# Patient Record
Sex: MALE
[De-identification: names, ages, dates, MRNs, and addresses within clinical notes are randomized per-mention and may not be internally consistent; named-entity substitution may affect disease eponyms.]

---

## 2021-08-31 ENCOUNTER — APPOINTMENT (OUTPATIENT)
Dept: GASTROENTEROLOGY | Facility: CLINIC | Age: 61
End: 2021-08-31
Payer: COMMERCIAL

## 2021-08-31 VITALS
TEMPERATURE: 97.5 F | BODY MASS INDEX: 26.6 KG/M2 | OXYGEN SATURATION: 97 % | WEIGHT: 190 LBS | RESPIRATION RATE: 17 BRPM | SYSTOLIC BLOOD PRESSURE: 118 MMHG | HEIGHT: 71 IN | HEART RATE: 67 BPM | DIASTOLIC BLOOD PRESSURE: 82 MMHG

## 2021-08-31 DIAGNOSIS — E78.00 PURE HYPERCHOLESTEROLEMIA, UNSPECIFIED: ICD-10-CM

## 2021-08-31 DIAGNOSIS — F17.200 NICOTINE DEPENDENCE, UNSPECIFIED, UNCOMPLICATED: ICD-10-CM

## 2021-08-31 DIAGNOSIS — Z78.9 OTHER SPECIFIED HEALTH STATUS: ICD-10-CM

## 2021-08-31 PROBLEM — Z00.00 ENCOUNTER FOR PREVENTIVE HEALTH EXAMINATION: Status: ACTIVE | Noted: 2021-08-31

## 2021-08-31 PROCEDURE — 99244 OFF/OP CNSLTJ NEW/EST MOD 40: CPT

## 2021-08-31 RX ORDER — ACETAMINOPHEN/DIPHENHYDRAMINE 500MG-25MG
TABLET ORAL
Refills: 0 | Status: ACTIVE | COMMUNITY

## 2021-08-31 RX ORDER — LUBIPROSTONE 8 UG/1
8 CAPSULE ORAL
Qty: 60 | Refills: 0 | Status: ACTIVE | COMMUNITY
Start: 2021-08-31 | End: 1900-01-01

## 2021-08-31 RX ORDER — DOCUSATE SODIUM 100 MG/1
100 CAPSULE ORAL
Qty: 60 | Refills: 3 | Status: ACTIVE | OUTPATIENT
Start: 2021-08-31

## 2021-08-31 NOTE — HISTORY OF PRESENT ILLNESS
[FreeTextEntry1] : Patient is a 60-year-old gentleman with history of irritable bowel syndrome with predominant constipation.  He has tried Metamucil and prune juice in the past and is currently taking Swiss Sd.  He had been on Linzess 290 mcg/day but took it on a as needed basis because of diarrhea.  He does complain of occasional bloating he denies seeing any blood in the stool.  He has no upper gastrointestinal symptoms.\par He had a colonoscopy elsewhere 6 weeks ago which revealed sigmoid diverticulosis and hemorrhoids.  He had a redundant colon that was otherwise normal.

## 2021-08-31 NOTE — ASSESSMENT
[FreeTextEntry1] : Patient with IBS/constipation.  We will try Bene fiber 1 tablespoon with breakfast and dinner and Colace 1 to 2 capsules a day.  If this does not help Amitiza 8 mcg twice daily will be tried.

## 2022-02-22 ENCOUNTER — APPOINTMENT (OUTPATIENT)
Dept: GASTROENTEROLOGY | Facility: CLINIC | Age: 62
End: 2022-02-22
Payer: COMMERCIAL

## 2022-02-22 VITALS
WEIGHT: 195 LBS | SYSTOLIC BLOOD PRESSURE: 112 MMHG | DIASTOLIC BLOOD PRESSURE: 67 MMHG | TEMPERATURE: 97.3 F | OXYGEN SATURATION: 96 % | BODY MASS INDEX: 27.3 KG/M2 | HEIGHT: 71 IN | HEART RATE: 70 BPM | RESPIRATION RATE: 18 BRPM

## 2022-02-22 PROCEDURE — 99214 OFFICE O/P EST MOD 30 MIN: CPT

## 2022-02-22 RX ORDER — STANDARDIZED SENNA CONCENTRATE AND DOCUSATE SODIUM 8.6; 5 MG/1; MG/1
8.6-5 TABLET ORAL DAILY
Qty: 60 | Refills: 2 | Status: ACTIVE | COMMUNITY
Start: 2021-11-29

## 2022-02-22 RX ORDER — ROSUVASTATIN CALCIUM 5 MG/1
5 TABLET, FILM COATED ORAL
Qty: 30 | Refills: 0 | Status: ACTIVE | COMMUNITY
Start: 2021-11-10

## 2022-02-22 NOTE — HISTORY OF PRESENT ILLNESS
[FreeTextEntry1] : Patient is a 61-year-old gentleman with history of irritable bowel syndrome with predominant constipation.  He has tried Metamucil and prune juice in the past and is currently taking Swiss Sd.  He had been on Linzess 290 mcg/day but took it on a as needed basis because of diarrhea.  He does complain of occasional bloating he denies seeing any blood in the stool. \par  He has no upper gastrointestinal symptoms.\par He had a colonoscopy elsewhere 7/2021 which revealed sigmoid diverticulosis and hemorrhoids.  He had a redundant colon that was otherwise normal.\par Patient also had an upper endoscopy and that was essentially normal.

## 2022-02-22 NOTE — ASSESSMENT
[FreeTextEntry1] : Patient with continued constipation and some left lower quadrant bloating.  He will continue psyllium, Colace, prune juice.  He will take Linzess on a as needed basis.  He was told to take Senokot as on a more regular basis.

## 2022-03-01 ENCOUNTER — APPOINTMENT (OUTPATIENT)
Dept: GASTROENTEROLOGY | Facility: CLINIC | Age: 62
End: 2022-03-01
Payer: COMMERCIAL

## 2022-03-01 PROCEDURE — 99442: CPT

## 2022-03-06 NOTE — HISTORY OF PRESENT ILLNESS
[Home] : at home, [unfilled] , at the time of the visit. [Medical Office: ([facility])___] : at the medical office located in  [Verbal consent obtained from patient] : the patient, [unfilled] [FreeTextEntry1] : Patient is a 61-year-old gentleman with history of irritable bowel syndrome with predominant constipation.  He has tried Metamucil and prune juice in the past and is currently taking Swiss Sd.  He had been on Linzess 290 mcg/day but took it on a as needed basis because of diarrhea.  He does complain of occasional bloating he denies seeing any blood in the stool. \par  He has no upper gastrointestinal symptoms.\par He had a colonoscopy elsewhere 7/2021 which revealed sigmoid diverticulosis and hemorrhoids.  He had a redundant colon that was otherwise normal.\par Patient also had an upper endoscopy and that was essentially normal.

## 2022-03-06 NOTE — ASSESSMENT
[FreeTextEntry1] : Patient still with irregular bowel movements.  He does complain of constipation and bloating.  He has not been taking Linzess regularly.  He will take Linzess daily.  We may need to add a lactulose or Senokot-S to his regimen.\par \par Time spent in counseling 15 min.

## 2022-08-10 ENCOUNTER — APPOINTMENT (OUTPATIENT)
Dept: GASTROENTEROLOGY | Facility: CLINIC | Age: 62
End: 2022-08-10

## 2022-08-10 VITALS
OXYGEN SATURATION: 97 % | WEIGHT: 197 LBS | SYSTOLIC BLOOD PRESSURE: 120 MMHG | HEART RATE: 68 BPM | BODY MASS INDEX: 27.58 KG/M2 | TEMPERATURE: 97.9 F | HEIGHT: 71 IN | DIASTOLIC BLOOD PRESSURE: 80 MMHG

## 2022-08-10 PROCEDURE — 99214 OFFICE O/P EST MOD 30 MIN: CPT

## 2022-08-14 NOTE — HISTORY OF PRESENT ILLNESS
[FreeTextEntry1] : Patient is a 61-year-old gentleman with history of irritable bowel syndrome with predominant constipation.  He has tried Metamucil and prune juice in the past and continues to take..  He had been on Linzess 290 mcg/day but took it on an as needed basis because of diarrhea. Takes infrequently.  He does complain of occasional bloating he denies seeing any blood in the stool. \par  He has no upper gastrointestinal symptoms.\par He had a colonoscopy elsewhere 7/2021 which revealed sigmoid diverticulosis and hemorrhoids.  He had a redundant colon that was otherwise normal.\par Patient also had an upper endoscopy and that was essentially normal.\par \par

## 2022-08-14 NOTE — ASSESSMENT
[FreeTextEntry1] : Patient with history of IBS and predominant constipation.  He continues to take Metamucil.  He takes Linzess infrequently.  He still complaining of some bloating and rectal pressure.  He will be given IBgard.  Abdominal sonogram will be ordered.

## 2022-08-15 ENCOUNTER — APPOINTMENT (OUTPATIENT)
Dept: ULTRASOUND IMAGING | Facility: CLINIC | Age: 62
End: 2022-08-15

## 2022-08-15 PROCEDURE — 76700 US EXAM ABDOM COMPLETE: CPT

## 2023-02-08 ENCOUNTER — APPOINTMENT (OUTPATIENT)
Dept: GASTROENTEROLOGY | Facility: CLINIC | Age: 63
End: 2023-02-08

## 2023-02-13 ENCOUNTER — APPOINTMENT (OUTPATIENT)
Dept: GASTROENTEROLOGY | Facility: CLINIC | Age: 63
End: 2023-02-13
Payer: COMMERCIAL

## 2023-02-13 VITALS
OXYGEN SATURATION: 96 % | WEIGHT: 190 LBS | TEMPERATURE: 97.8 F | HEIGHT: 71 IN | SYSTOLIC BLOOD PRESSURE: 112 MMHG | HEART RATE: 81 BPM | BODY MASS INDEX: 26.6 KG/M2 | DIASTOLIC BLOOD PRESSURE: 69 MMHG

## 2023-02-13 DIAGNOSIS — R14.0 ABDOMINAL DISTENSION (GASEOUS): ICD-10-CM

## 2023-02-13 PROCEDURE — 99214 OFFICE O/P EST MOD 30 MIN: CPT

## 2023-02-13 RX ORDER — PSYLLIUM SEED
PACKET (EA) ORAL
Refills: 0 | Status: ACTIVE | COMMUNITY

## 2023-02-13 NOTE — ASSESSMENT
[FreeTextEntry1] : Patient with chronic IBS symptoms and irregular bowel movements.  He does have some bloating.  An abdominal sonogram was normal.  Recent colonoscopy revealed sigmoid diverticulosis and a redundant sigmoid colon.  He will continue Metamucil and and prune juice.  We will give him a trial of Atrantil.  FOBT will be sent to the lab.  If the symptoms persist, a CAT scan of the abdomen and pelvis will be ordered.

## 2023-02-13 NOTE — PHYSICAL EXAM
[Alert] : alert [Normal Voice/Communication] : normal voice/communication [Healthy Appearing] : healthy appearing [No Acute Distress] : no acute distress [Sclera] : the sclera and conjunctiva were normal [Hearing Threshold Finger Rub Not [place]] : hearing was normal [Normal Lips/Gums] : the lips and gums were normal [Oropharynx] : the oropharynx was normal [Normal Appearance] : the appearance of the neck was normal [No Neck Mass] : no neck mass was observed [No Respiratory Distress] : no respiratory distress [No Acc Muscle Use] : no accessory muscle use [Respiration, Rhythm And Depth] : normal respiratory rhythm and effort [Auscultation Breath Sounds / Voice Sounds] : lungs were clear to auscultation bilaterally [Heart Rate And Rhythm] : heart rate was normal and rhythm regular [Normal S1, S2] : normal S1 and S2 [Murmurs] : no murmurs [Bowel Sounds] : normal bowel sounds [Abdomen Tenderness] : non-tender [No Masses] : no abdominal mass palpated [Abdomen Soft] : soft [] : no hepatosplenomegaly [No CVA Tenderness] : no CVA  tenderness [No Spinal Tenderness] : no spinal tenderness [Abnormal Walk] : normal gait [Oriented To Time, Place, And Person] : oriented to person, place, and time

## 2023-02-13 NOTE — HISTORY OF PRESENT ILLNESS
[FreeTextEntry1] : Patient is a 62-year-old gentleman with history of irritable bowel syndrome with predominant constipation.  He has tried Metamucil and prune juice in the past and continues to take..  He had been on Linzess 290 mcg/day but took it on an as needed basis because of diarrhea. Takes infrequently.  He does complain of occasional bloating. He denies seeing any blood in the stool. \par  He has no upper gastrointestinal symptoms.\par He had a colonoscopy elsewhere 7/2021 which revealed sigmoid diverticulosis and hemorrhoids.  He had a redundant colon that was otherwise normal.\par Patient also had an upper endoscopy and that was essentially normal.\par An abdominal sonogram done on 8/15/2022 was essentially normal.\par \par

## 2023-02-26 LAB — HEMOCCULT STL QL IA: NEGATIVE

## 2023-03-27 ENCOUNTER — OFFICE (OUTPATIENT)
Dept: URBAN - METROPOLITAN AREA CLINIC 90 | Facility: CLINIC | Age: 63
Setting detail: OPHTHALMOLOGY
End: 2023-03-27
Payer: COMMERCIAL

## 2023-03-27 DIAGNOSIS — H25.13: ICD-10-CM

## 2023-03-27 DIAGNOSIS — H40.013: ICD-10-CM

## 2023-03-27 DIAGNOSIS — H31.011: ICD-10-CM

## 2023-03-27 PROCEDURE — 92134 CPTRZ OPH DX IMG PST SGM RTA: CPT | Performed by: OPHTHALMOLOGY

## 2023-03-27 PROCEDURE — 92083 EXTENDED VISUAL FIELD XM: CPT | Performed by: OPHTHALMOLOGY

## 2023-03-27 PROCEDURE — 92014 COMPRE OPH EXAM EST PT 1/>: CPT | Performed by: OPHTHALMOLOGY

## 2023-03-27 ASSESSMENT — REFRACTION_MANIFEST
OS_VA2: 20/20
OD_VA2: 20/20
OS_VA2: 20/25(J1)
OS_CYLINDER: -0.50
OU_VA: 20/20
OS_AXIS: 075
OD_SPHERE: +2.50
OS_VA1: 20/20
OD_AXIS: 140
OD_VA2: 20/25(J1)
OS_CYLINDER: -1.00
OD_CYLINDER: -0.25
OD_ADD: +2.25
OS_SPHERE: +3.00
OS_AXIS: 089
OD_SPHERE: +3.50
OS_ADD: +2.25
OD_ADD: +2.50
OD_CYLINDER: -0.25
OS_SPHERE: PLANO
OD_VA1: 20/20
OD_ADD: +2.50
OD_SPHERE: PLANO
OS_SPHERE: PLANO
OS_ADD: +2.50
OS_VA1: 20/20
OD_AXIS: 140
OD_CYLINDER: -0.25
OS_SPHERE: +2.50
OS_SPHERE: +0.25
OS_AXIS: 075
OS_VA2: 20/20
OD_AXIS: 115
OS_ADD: +2.50
OD_VA1: 20/20
OS_CYLINDER: -1.00
OD_VA2: 20/20

## 2023-03-27 ASSESSMENT — KERATOMETRY
OD_K2POWER_DIOPTERS: 41.25
OD_K1POWER_DIOPTERS: 40.25
OS_K2POWER_DIOPTERS: 41.00
OS_AXISANGLE_DEGREES: 099
OS_K1POWER_DIOPTERS: 40.75
METHOD_AUTO_MANUAL: AUTO
OD_AXISANGLE_DEGREES: 085

## 2023-03-27 ASSESSMENT — REFRACTION_AUTOREFRACTION
OD_AXIS: 155
OS_SPHERE: +0.50
OS_CYLINDER: -0.75
OD_SPHERE: +0.75
OS_AXIS: 089
OD_CYLINDER: -0.25

## 2023-03-27 ASSESSMENT — SPHEQUIV_DERIVED
OS_SPHEQUIV: 0.125
OD_SPHEQUIV: 0.625
OD_SPHEQUIV: 3.375
OS_SPHEQUIV: 2.5
OS_SPHEQUIV: -0.25

## 2023-03-27 ASSESSMENT — AXIALLENGTH_DERIVED
OS_AL: 24.7035
OS_AL: 23.5821
OD_AL: 23.291
OD_AL: 24.3842
OS_AL: 24.5443

## 2023-03-27 ASSESSMENT — TONOMETRY
OS_IOP_MMHG: 11
OD_IOP_MMHG: 10

## 2023-03-27 ASSESSMENT — CONFRONTATIONAL VISUAL FIELD TEST (CVF)
OD_FINDINGS: FULL
OS_FINDINGS: FULL

## 2023-03-27 ASSESSMENT — VISUAL ACUITY
OS_BCVA: 20/20
OD_BCVA: 20/25+1

## 2023-07-11 ENCOUNTER — APPOINTMENT (OUTPATIENT)
Dept: GASTROENTEROLOGY | Facility: CLINIC | Age: 63
End: 2023-07-11
Payer: COMMERCIAL

## 2023-07-11 VITALS
BODY MASS INDEX: 26.04 KG/M2 | DIASTOLIC BLOOD PRESSURE: 73 MMHG | TEMPERATURE: 98.1 F | HEIGHT: 71 IN | OXYGEN SATURATION: 98 % | SYSTOLIC BLOOD PRESSURE: 109 MMHG | HEART RATE: 67 BPM | WEIGHT: 186 LBS

## 2023-07-11 PROCEDURE — 99214 OFFICE O/P EST MOD 30 MIN: CPT

## 2023-07-11 NOTE — ASSESSMENT
[FreeTextEntry1] : Bowel syndrome with constipation.  Recent exacerbation. Dietary and lifestyle modification discussed with the patient.  This was discussed with the patient.  He understands and all questions were answered. Patient is referred for a CT of the abdomen and pelvis.\par Rectal bleeding due to internal hemorrhoids.  Topical therapy prescribed.\par

## 2023-07-11 NOTE — PHYSICAL EXAM
[Cervical Lymph Nodes Enlarged Posterior Bilaterally] : no posterior cervical lymphadenopathy [No CVA Tenderness] : no CVA  tenderness [Abnormal Walk] : normal gait [Motor Exam] : the motor exam was normal [Normal] : oriented to person, place, and time [Normal Mood] : the mood was normal [Oriented To Time, Place, And Person] : oriented to person, place, and time

## 2023-07-11 NOTE — HISTORY OF PRESENT ILLNESS
[FreeTextEntry1] : 62-year-old man with a long history of irritable bowel syndrome with constipation.  Takes Metamucil and a stool softener but recently he continues to complain of incomplete evacuation and bloating.  Takes Metamucil daily.  He also has rectal pain and hemorrhoid problem.  He denies rectal bleeding, melena or hematemesis.  Colonoscopy in 2021 showed sigmoid diverticulosis and internal hemorrhoids but was otherwise unremarkable.

## 2023-07-12 ENCOUNTER — RESULT REVIEW (OUTPATIENT)
Age: 63
End: 2023-07-12

## 2023-07-13 RX ORDER — IBUPROFEN 800 MG/1
800 TABLET, FILM COATED ORAL
Qty: 20 | Refills: 0 | Status: ACTIVE | COMMUNITY
Start: 2023-03-13

## 2023-07-13 RX ORDER — TAMSULOSIN HYDROCHLORIDE 0.4 MG/1
0.4 CAPSULE ORAL
Qty: 30 | Refills: 0 | Status: ACTIVE | COMMUNITY
Start: 2023-06-15

## 2023-07-13 RX ORDER — CEPHALEXIN 500 MG/1
500 TABLET ORAL
Qty: 20 | Refills: 0 | Status: ACTIVE | COMMUNITY
Start: 2023-04-04

## 2023-07-13 RX ORDER — CEPHALEXIN 500 MG/1
500 CAPSULE ORAL
Qty: 20 | Refills: 0 | Status: ACTIVE | COMMUNITY
Start: 2023-04-04

## 2023-07-15 ENCOUNTER — APPOINTMENT (OUTPATIENT)
Dept: CT IMAGING | Facility: IMAGING CENTER | Age: 63
End: 2023-07-15
Payer: COMMERCIAL

## 2023-07-15 ENCOUNTER — OUTPATIENT (OUTPATIENT)
Dept: OUTPATIENT SERVICES | Facility: HOSPITAL | Age: 63
LOS: 1 days | End: 2023-07-15
Payer: COMMERCIAL

## 2023-07-15 DIAGNOSIS — K64.8 OTHER HEMORRHOIDS: ICD-10-CM

## 2023-07-15 DIAGNOSIS — K57.92 DIVERTICULITIS OF INTESTINE, PART UNSPECIFIED, WITHOUT PERFORATION OR ABSCESS WITHOUT BLEEDING: ICD-10-CM

## 2023-07-15 PROCEDURE — 74177 CT ABD & PELVIS W/CONTRAST: CPT | Mod: 26

## 2023-07-15 PROCEDURE — 74177 CT ABD & PELVIS W/CONTRAST: CPT

## 2023-07-20 ENCOUNTER — NON-APPOINTMENT (OUTPATIENT)
Age: 63
End: 2023-07-20

## 2023-08-10 ENCOUNTER — APPOINTMENT (OUTPATIENT)
Dept: GASTROENTEROLOGY | Facility: CLINIC | Age: 63
End: 2023-08-10
Payer: COMMERCIAL

## 2023-08-10 VITALS
TEMPERATURE: 97.8 F | HEIGHT: 71.5 IN | BODY MASS INDEX: 26.02 KG/M2 | HEART RATE: 66 BPM | SYSTOLIC BLOOD PRESSURE: 104 MMHG | WEIGHT: 190 LBS | DIASTOLIC BLOOD PRESSURE: 64 MMHG | OXYGEN SATURATION: 98 %

## 2023-08-10 DIAGNOSIS — K57.92 DIVERTICULITIS OF INTESTINE, PART UNSPECIFIED, W/OUT PERFORATION OR ABSCESS W/OUT BLEEDING: ICD-10-CM

## 2023-08-10 PROCEDURE — 99214 OFFICE O/P EST MOD 30 MIN: CPT

## 2023-08-10 RX ORDER — AMOXICILLIN 500 MG/1
500 CAPSULE ORAL
Qty: 21 | Refills: 0 | Status: DISCONTINUED | COMMUNITY
Start: 2023-03-13 | End: 2023-08-10

## 2023-08-10 RX ORDER — LINACLOTIDE 290 UG/1
290 CAPSULE, GELATIN COATED ORAL
Qty: 90 | Refills: 2 | Status: ACTIVE | COMMUNITY
Start: 2021-11-29 | End: 1900-01-01

## 2023-08-10 NOTE — ASSESSMENT
[FreeTextEntry1] : Irritable bowel syndrome with constipation.  Dietary and lifestyle modification discussed with the patient.  Patient will continue on prune juice and the fiber supplement.  Linzess as needed. This  was discussed with the patient.  He understands and all questions were answered.

## 2023-08-10 NOTE — HISTORY OF PRESENT ILLNESS
[FreeTextEntry1] : 62-year-old male with chronic constipation.  He currently takes a fiber supplement and prune juice with some response.  He has previously been on Linzess and Amitiza but has not been taking it at the present time.  Recent CT of the abdomen pelvis was unremarkable.  He denies rectal bleeding, melena or hematemesis.

## 2023-11-09 ENCOUNTER — APPOINTMENT (OUTPATIENT)
Dept: GASTROENTEROLOGY | Facility: CLINIC | Age: 63
End: 2023-11-09
Payer: COMMERCIAL

## 2023-11-09 VITALS
HEART RATE: 68 BPM | SYSTOLIC BLOOD PRESSURE: 116 MMHG | HEIGHT: 71 IN | TEMPERATURE: 97.7 F | BODY MASS INDEX: 26.74 KG/M2 | OXYGEN SATURATION: 98 % | WEIGHT: 191 LBS | DIASTOLIC BLOOD PRESSURE: 76 MMHG

## 2023-11-09 PROCEDURE — 99214 OFFICE O/P EST MOD 30 MIN: CPT

## 2023-11-09 RX ORDER — TANNIC/CHESTNUT/PEPPERMINT 275 MG
CAPSULE ORAL
Qty: 90 | Refills: 0 | Status: DISCONTINUED | OUTPATIENT
Start: 2023-02-13 | End: 2023-11-09

## 2023-11-09 RX ORDER — PEPPERMINT OIL 90 MG
90 CAPSULE, DELAYED, AND EXTENDED RELEASE ORAL
Qty: 60 | Refills: 0 | Status: DISCONTINUED | OUTPATIENT
Start: 2022-08-10 | End: 2023-11-09

## 2023-11-09 RX ORDER — WHEAT DEXTRIN 3 G/4 G
POWDER (GRAM) ORAL
Qty: 1 | Refills: 0 | Status: DISCONTINUED | OUTPATIENT
Start: 2021-08-31 | End: 2023-11-09

## 2023-12-07 ENCOUNTER — APPOINTMENT (OUTPATIENT)
Dept: GASTROENTEROLOGY | Facility: CLINIC | Age: 63
End: 2023-12-07
Payer: COMMERCIAL

## 2023-12-07 VITALS
WEIGHT: 188 LBS | DIASTOLIC BLOOD PRESSURE: 75 MMHG | BODY MASS INDEX: 26.32 KG/M2 | HEART RATE: 73 BPM | OXYGEN SATURATION: 98 % | TEMPERATURE: 97.9 F | SYSTOLIC BLOOD PRESSURE: 125 MMHG | HEIGHT: 71 IN

## 2023-12-07 DIAGNOSIS — K59.00 CONSTIPATION, UNSPECIFIED: ICD-10-CM

## 2023-12-07 DIAGNOSIS — K64.8 OTHER HEMORRHOIDS: ICD-10-CM

## 2023-12-07 PROCEDURE — 99214 OFFICE O/P EST MOD 30 MIN: CPT

## 2023-12-07 RX ORDER — HYDROCORTISONE 10 MG/G
1 CREAM TOPICAL
Qty: 1 | Refills: 0 | Status: ACTIVE | OUTPATIENT
Start: 2023-07-11

## 2024-03-07 ENCOUNTER — APPOINTMENT (OUTPATIENT)
Dept: GASTROENTEROLOGY | Facility: CLINIC | Age: 64
End: 2024-03-07
Payer: COMMERCIAL

## 2024-03-07 VITALS
OXYGEN SATURATION: 97 % | DIASTOLIC BLOOD PRESSURE: 82 MMHG | BODY MASS INDEX: 26.88 KG/M2 | HEART RATE: 65 BPM | WEIGHT: 192 LBS | TEMPERATURE: 98 F | RESPIRATION RATE: 18 BRPM | HEIGHT: 71 IN | SYSTOLIC BLOOD PRESSURE: 135 MMHG

## 2024-03-07 DIAGNOSIS — R19.8 OTHER SPECIFIED SYMPTOMS AND SIGNS INVOLVING THE DIGESTIVE SYSTEM AND ABDOMEN: ICD-10-CM

## 2024-03-07 DIAGNOSIS — K58.9 IRRITABLE BOWEL SYNDROME W/OUT DIARRHEA: ICD-10-CM

## 2024-03-07 DIAGNOSIS — K59.4 ANAL SPASM: ICD-10-CM

## 2024-03-07 PROCEDURE — 99214 OFFICE O/P EST MOD 30 MIN: CPT

## 2024-03-07 NOTE — ASSESSMENT
[FreeTextEntry1] : Rectal spasm. Dietary and lifestyle modification discussed with the patient.  Continue current medical regimen.  Patient is referred to urology for further evaluation.  This was discussed with the patient.  He understands and all questions were answered.

## 2024-03-07 NOTE — HISTORY OF PRESENT ILLNESS
[FreeTextEntry1] : 63-year-old male with chronic rectal spasm.  He takes Lubiprostone 8mcg and MiraLAX daily with good response however still complains of intermittent rectal spasm.  He saw Dr. Ordonez who prescribed topical therapy.  He has not followed up with a urologist.

## 2024-04-17 ENCOUNTER — RX RENEWAL (OUTPATIENT)
Age: 64
End: 2024-04-17

## 2024-04-17 RX ORDER — LUBIPROSTONE 8 UG/1
8 CAPSULE ORAL TWICE DAILY
Qty: 60 | Refills: 3 | Status: ACTIVE | COMMUNITY
Start: 2023-11-09 | End: 1900-01-01

## 2024-04-30 ENCOUNTER — APPOINTMENT (OUTPATIENT)
Dept: UROLOGY | Facility: CLINIC | Age: 64
End: 2024-04-30

## 2024-05-02 ENCOUNTER — OFFICE (OUTPATIENT)
Dept: URBAN - METROPOLITAN AREA CLINIC 90 | Facility: CLINIC | Age: 64
Setting detail: OPHTHALMOLOGY
End: 2024-05-02
Payer: COMMERCIAL

## 2024-05-02 DIAGNOSIS — H11.133: ICD-10-CM

## 2024-05-02 DIAGNOSIS — H31.011: ICD-10-CM

## 2024-05-02 DIAGNOSIS — H40.013: ICD-10-CM

## 2024-05-02 DIAGNOSIS — H25.13: ICD-10-CM

## 2024-05-02 PROCEDURE — 92083 EXTENDED VISUAL FIELD XM: CPT | Performed by: OPHTHALMOLOGY

## 2024-05-02 PROCEDURE — 92014 COMPRE OPH EXAM EST PT 1/>: CPT | Performed by: OPHTHALMOLOGY

## 2024-05-02 PROCEDURE — 76514 ECHO EXAM OF EYE THICKNESS: CPT | Performed by: OPHTHALMOLOGY

## 2024-05-02 PROCEDURE — 92250 FUNDUS PHOTOGRAPHY W/I&R: CPT | Performed by: OPHTHALMOLOGY

## 2024-05-02 ASSESSMENT — CONFRONTATIONAL VISUAL FIELD TEST (CVF)
OD_FINDINGS: FULL
OS_FINDINGS: FULL

## 2024-05-07 ENCOUNTER — APPOINTMENT (OUTPATIENT)
Dept: UROLOGY | Facility: CLINIC | Age: 64
End: 2024-05-07
Payer: COMMERCIAL

## 2024-05-07 VITALS
HEIGHT: 71 IN | DIASTOLIC BLOOD PRESSURE: 73 MMHG | SYSTOLIC BLOOD PRESSURE: 126 MMHG | WEIGHT: 185 LBS | HEART RATE: 68 BPM | BODY MASS INDEX: 25.9 KG/M2 | TEMPERATURE: 97.2 F | RESPIRATION RATE: 16 BRPM

## 2024-05-07 DIAGNOSIS — R37 SEXUAL DYSFUNCTION, UNSPECIFIED: ICD-10-CM

## 2024-05-07 DIAGNOSIS — R39.15 URGENCY OF URINATION: ICD-10-CM

## 2024-05-07 PROCEDURE — 99204 OFFICE O/P NEW MOD 45 MIN: CPT

## 2024-05-07 RX ORDER — TADALAFIL 5 MG/1
5 TABLET ORAL
Qty: 30 | Refills: 3 | Status: ACTIVE | COMMUNITY
Start: 2024-05-07 | End: 1900-01-01

## 2024-05-08 LAB
APPEARANCE: CLEAR
BACTERIA UR CULT: NORMAL
BACTERIA: NEGATIVE /HPF
BILIRUBIN URINE: NEGATIVE
BLOOD URINE: NEGATIVE
CAST: 0 /LPF
COLOR: YELLOW
EPITHELIAL CELLS: 0 /HPF
GLUCOSE QUALITATIVE U: NEGATIVE MG/DL
KETONES URINE: NEGATIVE MG/DL
LEUKOCYTE ESTERASE URINE: NEGATIVE
MICROSCOPIC-UA: NORMAL
NITRITE URINE: NEGATIVE
PH URINE: 6.5
PROTEIN URINE: NEGATIVE MG/DL
PSA FREE FLD-MCNC: 32 %
PSA FREE SERPL-MCNC: 0.2 NG/ML
PSA SERPL-MCNC: 0.62 NG/ML
RED BLOOD CELLS URINE: 1 /HPF
SPECIFIC GRAVITY URINE: 1.02
UROBILINOGEN URINE: 0.2 MG/DL
WHITE BLOOD CELLS URINE: 0 /HPF

## 2024-05-09 PROBLEM — R39.15 URINARY URGENCY: Status: ACTIVE | Noted: 2024-03-07

## 2024-05-09 NOTE — HISTORY OF PRESENT ILLNESS
[FreeTextEntry1] : 5/7/24 - This 63-year-old male presents for eval of frequent urination and decreased FOS.  Nocturia is 6-8 and daytime is 8-10x. He was maintained on medication previously - does not remember what but he stopped due to sexual side effects. Last PSA was unknown and one year ago. Was told by former urologist that prostate is enlarged. Denies previous prostate MRI or PNB. Denies hematuria, dysuria.  Has current concerns about ED - c/o premature ejaculation and decreased sensation when he orgasms. No complaints over the quality of the erection and he informs that he is able to complete the act. Does have a slight decrease in libido over the last 2 years.   PMH: Horseshoe kidney, HLD, constipation  PSH: Denies FH: Non-contributory Social: current smoker

## 2024-09-04 ENCOUNTER — RX RENEWAL (OUTPATIENT)
Age: 64
End: 2024-09-04

## 2024-09-04 ENCOUNTER — APPOINTMENT (OUTPATIENT)
Dept: UROLOGY | Facility: CLINIC | Age: 64
End: 2024-09-04
Payer: COMMERCIAL

## 2024-09-04 VITALS
SYSTOLIC BLOOD PRESSURE: 122 MMHG | WEIGHT: 185 LBS | DIASTOLIC BLOOD PRESSURE: 81 MMHG | BODY MASS INDEX: 25.8 KG/M2 | HEART RATE: 64 BPM

## 2024-09-04 DIAGNOSIS — N40.1 BENIGN PROSTATIC HYPERPLASIA WITH LOWER URINARY TRACT SYMPMS: ICD-10-CM

## 2024-09-04 DIAGNOSIS — N13.8 BENIGN PROSTATIC HYPERPLASIA WITH LOWER URINARY TRACT SYMPMS: ICD-10-CM

## 2024-09-04 PROCEDURE — G2211 COMPLEX E/M VISIT ADD ON: CPT | Mod: NC

## 2024-09-04 PROCEDURE — 99213 OFFICE O/P EST LOW 20 MIN: CPT

## 2024-09-04 RX ORDER — TERAZOSIN 2 MG/1
2 CAPSULE ORAL
Qty: 7 | Refills: 0 | Status: ACTIVE | COMMUNITY
Start: 2024-09-04 | End: 1900-01-01

## 2024-09-04 RX ORDER — TERAZOSIN 5 MG/1
5 CAPSULE ORAL
Qty: 90 | Refills: 0 | Status: ACTIVE | COMMUNITY
Start: 2024-09-04 | End: 1900-01-01

## 2024-09-05 NOTE — HISTORY OF PRESENT ILLNESS
[FreeTextEntry1] : 5/7/24 - This 63-year-old male presents for eval of frequent urination and decreased FOS.  Nocturia is 6-8 and daytime is 8-10x. He was maintained on medication previously - does not remember what but he stopped due to sexual side effects. Last PSA was unknown and one year ago. Was told by former urologist that prostate is enlarged. Denies previous prostate MRI or PNB. Denies hematuria, dysuria.  Has current concerns about ED - c/o premature ejaculation and decreased sensation when he orgasms. No complaints over the quality of the erection and he informs that he is able to complete the act. Does have a slight decrease in libido over the last 2 years.   PMH: Horseshoe kidney, HLD, constipation  PSH: Denies FH: Non-contributory Social: current smoker  9/24 was prescribed tadaifil - but waited to try and PCP gave him tamsulosin. he noted a better flow but if anything perhaps more frequency and blunted orgasm. then tried the tadalafil with no much change.  had him drink and wait for a Urolfow but machine did not record.

## 2024-09-09 ENCOUNTER — RX RENEWAL (OUTPATIENT)
Age: 64
End: 2024-09-09

## 2024-09-12 ENCOUNTER — RX RENEWAL (OUTPATIENT)
Age: 64
End: 2024-09-12

## 2024-10-22 ENCOUNTER — APPOINTMENT (OUTPATIENT)
Dept: UROLOGY | Facility: CLINIC | Age: 64
End: 2024-10-22
Payer: COMMERCIAL

## 2024-10-22 DIAGNOSIS — R35.1 NOCTURIA: ICD-10-CM

## 2024-10-22 PROCEDURE — G2211 COMPLEX E/M VISIT ADD ON: CPT | Mod: NC

## 2024-10-22 PROCEDURE — 99213 OFFICE O/P EST LOW 20 MIN: CPT

## 2024-10-22 PROCEDURE — 51798 US URINE CAPACITY MEASURE: CPT

## 2024-10-22 PROCEDURE — 51741 ELECTRO-UROFLOWMETRY FIRST: CPT

## 2024-10-23 ENCOUNTER — RX RENEWAL (OUTPATIENT)
Age: 64
End: 2024-10-23

## 2024-10-23 RX ORDER — TERAZOSIN 2 MG/1
2 CAPSULE ORAL
Qty: 90 | Refills: 3 | Status: ACTIVE | COMMUNITY
Start: 2024-10-22 | End: 1900-01-01

## 2024-10-28 ENCOUNTER — RX RENEWAL (OUTPATIENT)
Age: 64
End: 2024-10-28

## 2024-10-28 RX ORDER — TERAZOSIN 5 MG/1
5 CAPSULE ORAL
Qty: 90 | Refills: 1 | Status: ACTIVE | COMMUNITY
Start: 2024-10-22 | End: 1900-01-01

## 2024-11-14 ENCOUNTER — APPOINTMENT (OUTPATIENT)
Dept: GASTROENTEROLOGY | Facility: CLINIC | Age: 64
End: 2024-11-14
Payer: COMMERCIAL

## 2024-11-14 ENCOUNTER — LABORATORY RESULT (OUTPATIENT)
Age: 64
End: 2024-11-14

## 2024-11-14 VITALS
OXYGEN SATURATION: 95 % | HEART RATE: 81 BPM | SYSTOLIC BLOOD PRESSURE: 133 MMHG | DIASTOLIC BLOOD PRESSURE: 82 MMHG | BODY MASS INDEX: 26.74 KG/M2 | HEIGHT: 71 IN | WEIGHT: 191 LBS | TEMPERATURE: 97.2 F

## 2024-11-14 DIAGNOSIS — K58.9 IRRITABLE BOWEL SYNDROME, UNSPECIFIED: ICD-10-CM

## 2024-11-14 DIAGNOSIS — R14.0 ABDOMINAL DISTENSION (GASEOUS): ICD-10-CM

## 2024-11-14 DIAGNOSIS — R19.8 OTHER SPECIFIED SYMPTOMS AND SIGNS INVOLVING THE DIGESTIVE SYSTEM AND ABDOMEN: ICD-10-CM

## 2024-11-14 DIAGNOSIS — K59.4 ANAL SPASM: ICD-10-CM

## 2024-11-14 PROCEDURE — G2211 COMPLEX E/M VISIT ADD ON: CPT | Mod: NC

## 2024-11-14 PROCEDURE — 99214 OFFICE O/P EST MOD 30 MIN: CPT

## 2024-11-17 LAB
ALMOND IGE QN: <0.1 KUA/L
BRAZIL NUT IGE QN: <0.1 KUA/L
CASHEW NUT IGE QN: <0.1 KUA/L
CODFISH IGE QN: <0.1 KUA/L
COW MILK IGE QN: <0.1 KUA/L
DEPRECATED ALMOND IGE RAST QL: 0
DEPRECATED BRAZIL NUT IGE RAST QL: 0
DEPRECATED CASHEW NUT IGE RAST QL: 0
DEPRECATED CODFISH IGE RAST QL: 0
DEPRECATED COW MILK IGE RAST QL: 0
DEPRECATED EGG WHITE IGE RAST QL: 0
DEPRECATED HAZELNUT IGE RAST QL: 0
DEPRECATED PEANUT IGE RAST QL: 0
DEPRECATED SALMON IGE RAST QL: 0
DEPRECATED SCALLOP IGE RAST QL: <0.1 KUA/L
DEPRECATED SESAME SEED IGE RAST QL: 0
DEPRECATED SHRIMP IGE RAST QL: 0
DEPRECATED SOYBEAN IGE RAST QL: 0
DEPRECATED TUNA IGE RAST QL: 0
DEPRECATED WALNUT IGE RAST QL: 0
DEPRECATED WHEAT IGE RAST QL: 0
EGG WHITE IGE QN: <0.1 KUA/L
HAZELNUT IGE QN: <0.1 KUA/L
PEANUT IGE QN: <0.1 KUA/L
SALMON IGE QN: <0.1 KUA/L
SCALLOP IGE QN: 0
SCALLOP IGE QN: <0.1 KUA/L
SESAME SEED IGE QN: <0.1 KUA/L
SOYBEAN IGE QN: <0.1 KUA/L
TOTAL IGE SMQN RAST: 10 KU/L
TUNA IGE QN: <0.1 KUA/L
WALNUT IGE QN: <0.1 KUA/L
WHEAT IGE QN: <0.1 KUA/L

## 2024-11-20 LAB
CELIAC DISEASE INTERPRETATION: NORMAL
CELIAC GENE PAIRS PRESENT: NO
DQ ALPHA 1: NORMAL
DQ BETA 1: NORMAL
IMMUNOGLOBULIN A (IGA): 118 MG/DL

## 2024-11-26 ENCOUNTER — TRANSCRIPTION ENCOUNTER (OUTPATIENT)
Age: 64
End: 2024-11-26

## 2024-12-16 ENCOUNTER — RX RENEWAL (OUTPATIENT)
Age: 64
End: 2024-12-16

## 2025-02-24 ENCOUNTER — RX RENEWAL (OUTPATIENT)
Age: 65
End: 2025-02-24

## 2025-03-19 ENCOUNTER — OUTPATIENT (OUTPATIENT)
Dept: OUTPATIENT SERVICES | Facility: HOSPITAL | Age: 65
LOS: 1 days | End: 2025-03-19
Payer: COMMERCIAL

## 2025-03-19 ENCOUNTER — RESULT REVIEW (OUTPATIENT)
Age: 65
End: 2025-03-19

## 2025-03-19 ENCOUNTER — APPOINTMENT (OUTPATIENT)
Dept: RADIOLOGY | Facility: CLINIC | Age: 65
End: 2025-03-19
Payer: COMMERCIAL

## 2025-03-19 DIAGNOSIS — Z00.8 ENCOUNTER FOR OTHER GENERAL EXAMINATION: ICD-10-CM

## 2025-03-19 PROCEDURE — 71046 X-RAY EXAM CHEST 2 VIEWS: CPT

## 2025-03-19 PROCEDURE — 71046 X-RAY EXAM CHEST 2 VIEWS: CPT | Mod: 26

## 2025-05-07 ENCOUNTER — OFFICE (OUTPATIENT)
Facility: LOCATION | Age: 65
Setting detail: OPHTHALMOLOGY
End: 2025-05-07
Payer: COMMERCIAL

## 2025-05-07 DIAGNOSIS — H25.13: ICD-10-CM

## 2025-05-07 DIAGNOSIS — H11.133: ICD-10-CM

## 2025-05-07 DIAGNOSIS — H31.011: ICD-10-CM

## 2025-05-07 DIAGNOSIS — H40.013: ICD-10-CM

## 2025-05-07 PROBLEM — H02.831 DERMATOCHALASIS; RIGHT UPPER LID, LEFT UPPER LID: Status: ACTIVE | Noted: 2025-05-07

## 2025-05-07 PROBLEM — H02.834 DERMATOCHALASIS; RIGHT UPPER LID, LEFT UPPER LID: Status: ACTIVE | Noted: 2025-05-07

## 2025-05-07 PROCEDURE — 92083 EXTENDED VISUAL FIELD XM: CPT | Performed by: OPHTHALMOLOGY

## 2025-05-07 PROCEDURE — 92014 COMPRE OPH EXAM EST PT 1/>: CPT | Performed by: OPHTHALMOLOGY

## 2025-05-07 PROCEDURE — 92133 CPTRZD OPH DX IMG PST SGM ON: CPT | Performed by: OPHTHALMOLOGY

## 2025-05-07 ASSESSMENT — REFRACTION_MANIFEST
OS_SPHERE: +0.25
OS_SPHERE: PLANO
OD_CYLINDER: -0.25
OD_VA1: 20/20
OS_VA2: 20/25(J1)
OD_CYLINDER: -0.25
OD_AXIS: 140
OD_SPHERE: PLANO
OS_SPHERE: +3.00
OS_AXIS: 089
OD_SPHERE: +2.50
OS_VA2: 20/20
OS_VA1: 20/20
OD_ADD: +2.25
OD_SPHERE: PLANO
OD_CYLINDER: -0.25
OS_CYLINDER: -0.50
OS_ADD: +2.25
OS_CYLINDER: -1.00
OU_VA: 20/20
OD_SPHERE: +3.50
OS_SPHERE: PLANO
OD_AXIS: 115
OD_ADD: +2.50
OS_AXIS: 075
OS_VA2: 20/20
OS_ADD: +2.50
OD_ADD: +2.50
OD_VA2: 20/20
OD_VA1: 20/20
OS_AXIS: 075
OS_ADD: +2.50
OS_SPHERE: +2.50
OD_SPHERE: PLANO
OD_VA2: 20/20
OD_AXIS: 140
OD_VA2: 20/25(J1)
OS_VA1: 20/20
OS_CYLINDER: -1.00

## 2025-05-07 ASSESSMENT — PACHYMETRY
OS_CT_UM: 593
OD_CT_UM: 587
OS_CT_CORRECTION: -4
OD_CT_CORRECTION: -3

## 2025-05-07 ASSESSMENT — KERATOMETRY
OD_K1POWER_DIOPTERS: 40.25
OD_K2POWER_DIOPTERS: 41.00
OS_K1POWER_DIOPTERS: 41.00
METHOD_AUTO_MANUAL: AUTO
OS_K2POWER_DIOPTERS: 41.00
OD_AXISANGLE_DEGREES: 074
OS_AXISANGLE_DEGREES: 090

## 2025-05-07 ASSESSMENT — VISUAL ACUITY
OS_BCVA: 20/20-2
OD_BCVA: 20/20-1

## 2025-05-07 ASSESSMENT — REFRACTION_AUTOREFRACTION
OS_SPHERE: +1.25
OS_AXIS: 090
OS_CYLINDER: -1.25
OD_CYLINDER: -0.50
OD_SPHERE: +1.25
OD_AXIS: 110

## 2025-05-07 ASSESSMENT — TONOMETRY
OS_IOP_MMHG: 10
OD_IOP_MMHG: 10

## 2025-05-07 ASSESSMENT — LID POSITION - PTOSIS
OD_PTOSIS: T 1+
OS_PTOSIS: 1+ 2+

## 2025-05-07 ASSESSMENT — LID POSITION - DERMATOCHALASIS
OD_DERMATOCHALASIS: RUL 2+
OS_DERMATOCHALASIS: LUL 2+

## 2025-05-07 ASSESSMENT — CONFRONTATIONAL VISUAL FIELD TEST (CVF)
OD_FINDINGS: FULL
OS_FINDINGS: FULL

## 2025-05-23 ENCOUNTER — APPOINTMENT (OUTPATIENT)
Dept: UROLOGY | Facility: CLINIC | Age: 65
End: 2025-05-23
Payer: COMMERCIAL

## 2025-05-23 VITALS
TEMPERATURE: 97.2 F | RESPIRATION RATE: 16 BRPM | SYSTOLIC BLOOD PRESSURE: 146 MMHG | HEART RATE: 77 BPM | HEIGHT: 71 IN | OXYGEN SATURATION: 97 % | BODY MASS INDEX: 26.74 KG/M2 | WEIGHT: 191 LBS | DIASTOLIC BLOOD PRESSURE: 85 MMHG

## 2025-05-23 DIAGNOSIS — N13.8 BENIGN PROSTATIC HYPERPLASIA WITH LOWER URINARY TRACT SYMPMS: ICD-10-CM

## 2025-05-23 DIAGNOSIS — R35.1 NOCTURIA: ICD-10-CM

## 2025-05-23 DIAGNOSIS — N40.1 BENIGN PROSTATIC HYPERPLASIA WITH LOWER URINARY TRACT SYMPMS: ICD-10-CM

## 2025-05-23 DIAGNOSIS — R37 SEXUAL DYSFUNCTION, UNSPECIFIED: ICD-10-CM

## 2025-05-23 PROCEDURE — 99214 OFFICE O/P EST MOD 30 MIN: CPT
